# Patient Record
Sex: MALE | Race: WHITE | NOT HISPANIC OR LATINO
[De-identification: names, ages, dates, MRNs, and addresses within clinical notes are randomized per-mention and may not be internally consistent; named-entity substitution may affect disease eponyms.]

---

## 2017-03-07 ENCOUNTER — RECORDS - HEALTHEAST (OUTPATIENT)
Dept: ADMINISTRATIVE | Facility: OTHER | Age: 62
End: 2017-03-07

## 2017-04-21 ENCOUNTER — AMBULATORY - HEALTHEAST (OUTPATIENT)
Dept: PULMONOLOGY | Facility: OTHER | Age: 62
End: 2017-04-21

## 2017-04-21 ENCOUNTER — RECORDS - HEALTHEAST (OUTPATIENT)
Dept: ADMINISTRATIVE | Facility: OTHER | Age: 62
End: 2017-04-21

## 2017-04-21 DIAGNOSIS — R06.02 SOB (SHORTNESS OF BREATH): ICD-10-CM

## 2017-04-27 ENCOUNTER — COMMUNICATION - HEALTHEAST (OUTPATIENT)
Dept: PULMONOLOGY | Facility: OTHER | Age: 62
End: 2017-04-27

## 2017-05-22 ENCOUNTER — OFFICE VISIT - HEALTHEAST (OUTPATIENT)
Dept: PULMONOLOGY | Facility: OTHER | Age: 62
End: 2017-05-22

## 2017-05-22 ENCOUNTER — RECORDS - HEALTHEAST (OUTPATIENT)
Dept: PULMONOLOGY | Facility: OTHER | Age: 62
End: 2017-05-22

## 2017-05-22 ENCOUNTER — RECORDS - HEALTHEAST (OUTPATIENT)
Dept: ADMINISTRATIVE | Facility: OTHER | Age: 62
End: 2017-05-22

## 2017-05-22 DIAGNOSIS — J43.9 PULMONARY EMPHYSEMA, UNSPECIFIED EMPHYSEMA TYPE (H): ICD-10-CM

## 2017-05-22 DIAGNOSIS — R06.02 SHORTNESS OF BREATH: ICD-10-CM

## 2017-05-22 RX ORDER — ALBUTEROL SULFATE 90 UG/1
2 AEROSOL, METERED RESPIRATORY (INHALATION) EVERY 6 HOURS PRN
Status: SHIPPED | COMMUNITY
Start: 2017-05-22

## 2017-05-22 RX ORDER — TIOTROPIUM BROMIDE 18 UG/1
18 CAPSULE ORAL; RESPIRATORY (INHALATION) DAILY
Status: SHIPPED | COMMUNITY
Start: 2017-05-22

## 2017-05-22 RX ORDER — MULTIPLE VITAMINS W/ MINERALS TAB 9MG-400MCG
1 TAB ORAL DAILY
Status: SHIPPED | COMMUNITY
Start: 2017-05-22

## 2017-05-22 ASSESSMENT — MIFFLIN-ST. JEOR: SCORE: 1477.99

## 2017-05-23 ENCOUNTER — AMBULATORY - HEALTHEAST (OUTPATIENT)
Dept: PULMONOLOGY | Facility: OTHER | Age: 62
End: 2017-05-23

## 2017-05-23 DIAGNOSIS — J44.9 COPD (CHRONIC OBSTRUCTIVE PULMONARY DISEASE) (H): ICD-10-CM

## 2017-06-01 ENCOUNTER — AMBULATORY - HEALTHEAST (OUTPATIENT)
Dept: PULMONOLOGY | Facility: OTHER | Age: 62
End: 2017-06-01

## 2017-06-14 ENCOUNTER — RECORDS - HEALTHEAST (OUTPATIENT)
Dept: ADMINISTRATIVE | Facility: OTHER | Age: 62
End: 2017-06-14

## 2017-06-15 ENCOUNTER — RECORDS - HEALTHEAST (OUTPATIENT)
Dept: ADMINISTRATIVE | Facility: OTHER | Age: 62
End: 2017-06-15

## 2017-09-11 ENCOUNTER — OFFICE VISIT - HEALTHEAST (OUTPATIENT)
Dept: PULMONOLOGY | Facility: OTHER | Age: 62
End: 2017-09-11

## 2017-09-11 ENCOUNTER — HOSPITAL ENCOUNTER (OUTPATIENT)
Dept: RESPIRATORY THERAPY | Facility: HOSPITAL | Age: 62
Discharge: HOME | End: 2017-09-11

## 2017-09-11 DIAGNOSIS — Z72.0 TOBACCO ABUSE: ICD-10-CM

## 2017-09-11 DIAGNOSIS — J44.9 CHRONIC OBSTRUCTIVE PULMONARY DISEASE, UNSPECIFIED COPD TYPE (H): ICD-10-CM

## 2017-09-11 DIAGNOSIS — J43.9 PULMONARY EMPHYSEMA, UNSPECIFIED EMPHYSEMA TYPE (H): ICD-10-CM

## 2017-09-11 RX ORDER — BUDESONIDE AND FORMOTEROL FUMARATE DIHYDRATE 160; 4.5 UG/1; UG/1
2 AEROSOL RESPIRATORY (INHALATION) 2 TIMES DAILY
Status: SHIPPED | COMMUNITY
Start: 2017-09-11

## 2017-11-17 ENCOUNTER — COMMUNICATION - HEALTHEAST (OUTPATIENT)
Dept: PULMONOLOGY | Facility: OTHER | Age: 62
End: 2017-11-17

## 2018-05-07 ENCOUNTER — RECORDS - HEALTHEAST (OUTPATIENT)
Dept: ADMINISTRATIVE | Facility: OTHER | Age: 63
End: 2018-05-07

## 2021-05-26 ENCOUNTER — RECORDS - HEALTHEAST (OUTPATIENT)
Dept: ADMINISTRATIVE | Facility: CLINIC | Age: 66
End: 2021-05-26

## 2021-05-31 ENCOUNTER — RECORDS - HEALTHEAST (OUTPATIENT)
Dept: ADMINISTRATIVE | Facility: CLINIC | Age: 66
End: 2021-05-31

## 2021-05-31 VITALS — HEIGHT: 71 IN | BODY MASS INDEX: 20.86 KG/M2 | WEIGHT: 149 LBS

## 2021-05-31 VITALS — WEIGHT: 152.1 LBS | BODY MASS INDEX: 21.21 KG/M2

## 2021-06-01 ENCOUNTER — RECORDS - HEALTHEAST (OUTPATIENT)
Dept: ADMINISTRATIVE | Facility: CLINIC | Age: 66
End: 2021-06-01

## 2021-06-10 NOTE — PROGRESS NOTES
Patient oxygen saturation on RA at rest is 95%.  Oxygen saturation after ambulating 300ft on RA is 92%, then did a flight of stairs on RA and O2 sats were 88%.    After ambulating 300ft on 2LPM oxygen saturation is 95-96%.    Patient is ambulatory within his/her home.

## 2021-06-10 NOTE — PROGRESS NOTES
CCx:  Shortness of breath    HPI:   Gregor Soriano is a 62-year-old male who was diagnosed with COPD back in 2009.  However, he said looking back at his reports of his scans in the mid 2000s that he had emphysema on an MRI report.  Patient was started on Spiriva and Advair back in 2009 when he was first diagnosed.  He states that at that time he was doing well.  Did not have much in terms of shortness of breath or cough.  He was then switched to Symbicort 2014 because of some insurance changes.  He states that at that time, he came down with a bad respiratory illness but did not require hospitalization.  He was never hospitalized or been on mechanical ventilation.  He has since switched back to Advair.  States that really cold or hot weather seem to bother him the most.  He is short of breath with moderate exertion.Otherwise, he does not note any specific alleviating or aggravating factors.  He was a previous smoker but quit in 2007.  He continues to smoke cannabis.  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  He does not note any specific triggers.    Work History: building maintenance, cleaning solutions  Hobbies: gardening  Pets: 3 cats and 1 dog  Tobacco Use: quit 2007, 1-1.5 for 38 years smoke cannabis  Home Environment: single family home, has trouble with basement stairs    PMH:  Kidney stones    PSH:  [place] teeth    SH:  Social History     Social History     Marital status: Single     Spouse name: N/A     Number of children: N/A     Years of education: N/A     Occupational History     Not on file.     Social History Main Topics     Smoking status: Current Some Day Smoker     Smokeless tobacco: Not on file     Alcohol use Not on file     Drug use: Yes     Special: Marijuana      Comment: very rare     Sexual activity: Not on file     Other Topics Concern     Not on file     Social History Narrative     No narrative on file       Family history:  Mother, brother, sister -  emphysema    ROS:  Review of Systems - History obtained from the patient  General ROS: negative  Psychological ROS: negative  ENT ROS: negative  Allergy and Immunology ROS: negative  Endocrine ROS: negative  Respiratory ROS: positive for - cough, shortness of breath and wheezing  negative for - sputum changes  Cardiovascular ROS: no chest pain or dyspnea on exertion  Gastrointestinal ROS: no abdominal pain, change in bowel habits, or black or bloody stools  Genito-Urinary ROS: no dysuria, trouble voiding, or hematuria  Musculoskeletal ROS: negative  Neurological ROS: no TIA or stroke symptoms  Dermatological ROS: negative      Current Meds:  Current Outpatient Prescriptions   Medication Sig     albuterol (PROAIR HFA;PROVENTIL HFA;VENTOLIN HFA) 90 mcg/actuation inhaler Inhale 2 puffs every 6 (six) hours as needed for wheezing.     fluticasone-salmeterol (ADVAIR HFA) 115-21 mcg/actuation inhaler Inhale 2 puffs 2 (two) times a day.     guaiFENesin ER (MUCUS RELIEF) 600 mg 12 hr tablet Take 1,200 mg by mouth 2 (two) times a day.     multivitamin with minerals (THERA-M) 9 mg iron-400 mcg Tab tablet Take 1 tablet by mouth daily.     tiotropium (SPIRIVA) 18 mcg inhalation capsule Place 18 mcg into inhaler and inhale daily.       Labs:  Recent Results (from the past 72 hour(s))   POCT hemoglobin   Result Value Ref Range    Hgb 13.6 7.0 g/dL       I have personally reviewed all imaging and PFT data available pertinent to this visit.    Imaging studies:      PFTs: 5/22/17  FEV1/FVC is 39% and is reduced.  FEV1 is 31% predicted and is reduced.  FVC is 60% predicted and reduced.  There was improvement in spirometry after a single inhaled dose of bronchodilator.  TLC is 130% predicted and is increased.  RV is 269% predicted and is increased.  DLCO is 31% predicted and is reduced.    Impression:  Full Pulmonary Function Test is abnormal.  PFTs are consistent with very severe  obstructive disease.  Bronchodilator response is  "consistent with reversibility.  There is hyperinflation.  There is air-trapping.  Diffusion capacity when corrected for hemoglobin is severely reduced.    Physical Exam:  /74  Pulse 76  Resp 20  Ht 5' 11\" (1.803 m)  Wt 149 lb (67.6 kg)  SpO2 95% Comment: RA  BMI 20.78 kg/m2  General - Well nourished  Ears/Mouth - TMs clear bilaterally,  OP pink moist, no thrush  Neck - no cervical lymphadenopathy  Lungs - diminished breath sounds throughout  CVS - regular rhythm with no murmurs, rubs or gallups  Abdomen - soft, NT, ND, NABS  Ext - no cyanosis, clubbing or edema  Skin - no rash  Psychology - alert and oriented, answers appropriate      Assessment and Plan:  1. COPD - very severe obstruction on PFTs.  Already on advair, spiriva, and albuterol prn.  Continue those for now.  Did ambulate on room air, and patient's O2 saturation did drop to 88%.  Will prescribe home oxygen at 2lnc with exertion.  Patient resistant to vaccinations, advised that they would help protect him, but he declined.  Counseled on inhaler use.  Has already quit smoking.  Discussed about possible referral to lung transplant for evaluation given his low diffusion and FEV1.  Also checked alpha 1 antitrypsin with emphysema affecting so many relatives at a young age.      2. Allergic rhinitis.  Advised to try OTC allergy medication which may help some.      F/U 3 months with 6 Minute Walk Test    Due to desaturation of SaO2 less than or equal to 88% on Room air from COPD, home oxygen therapy will benefit my patient's condition.  The patient has tried advair and spiriva with limited success and oxygen is still required.  This patient is mobile in the home and requires portability.    Patient needs portable oxygen with ambulation at 2LPM/NC.  Patient also needs POC.  OK for conserving device.  OK to do titration study if needed--keep oxygen saturation 89% and above.  Please issue conserving device with appropriate titrated setting after patient " completes successful assessment.    Checo Warner MD  Pulmonary and Critical Care Medicine  [phone]

## 2021-06-10 NOTE — PROGRESS NOTES
Called Nelson back to let him know we could try Dulera or Breo in place of the Advair, he said no he just wants to go back to the Symbicort.  Symbicort ordered and will leave a sputum culture at his primary Md's office on Thursday when he goes in.

## 2021-06-11 NOTE — PROGRESS NOTES
According to Abril at [facility] [place] the patient has been approved for a portable oxygen concentrator.

## 2021-06-11 NOTE — PROGRESS NOTES
Order for portable concentrator is in process and being handled through the [facility].  The phone number is [phone] and the contact is José Miguel.

## 2021-06-11 NOTE — PROGRESS NOTES
Spoke with Sandy at [facility] and she said that the order for portable concentrator is in process.

## 2021-06-12 NOTE — PROGRESS NOTES
CCx: F/U COPD    HPI:  Gregor Soriano is a 62-year-old male who was diagnosed with COPD back in 2009.  However, he said looking back at his reports of his scans in the mid 2000s that he had emphysema on an MRI report.  Patient was started on Spiriva and Advair back in 2009 when he was first diagnosed.  He states that at that time he was doing well.  Did not have much in terms of shortness of breath or cough.  He was then switched to Symbicort 2014 because of some insurance changes.  He states that at that time, he came down with a bad respiratory illness but did not require hospitalization.  He was never hospitalized or been on mechanical ventilation.  He has since switched back to Advair.  States that really cold or hot weather seem to bother him the most.  He is short of breath with moderate exertion.Otherwise, he does not note any specific alleviating or aggravating factors.  He was a previous smoker but quit in 2007.  He continues to smoke cannabis.  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  He does not note any specific triggers.    Since his last visit, he was treated for pneumococcal pneumonia with levaquin in May along with steroids. He has since improved [place].  He has since gotten a little worse, but overall feels well right now.  Now getting inhalers from [facility].  Having some sleep disturbance, says he sleeps sometimes only 2-3 hours a night.  But when he gets 6-7 hours of sleep, he feels well.      Work History: building maintenance, cleaning solutions  Hobbies: gardening  Pets: 3 cats and 1 dog  Tobacco Use: quit 2007, 1-1.5 for 38 years smoke cannabis  Home Environment: single family home, has trouble with basement stairs    ROS:  Review of Systems - History obtained from the patient  General ROS: negative  Psychological ROS: negative  ENT ROS: negative  Allergy and Immunology ROS: negative  Endocrine ROS: negative  Respiratory ROS: positive  for - cough and shortness of breath  negative for - stridor  Cardiovascular ROS: no chest pain or palpitations  Gastrointestinal ROS: no abdominal pain, change in bowel habits, or black or bloody stools  Genito-Urinary ROS: no dysuria, trouble voiding, or hematuria  Musculoskeletal ROS: negative  Neurological ROS: no TIA or stroke symptoms  Dermatological ROS: negative      Current Meds:  Current Outpatient Prescriptions   Medication Sig     albuterol (PROAIR HFA;PROVENTIL HFA;VENTOLIN HFA) 90 mcg/actuation inhaler Inhale 2 puffs every 6 (six) hours as needed for wheezing.     budesonide-formoterol (SYMBICORT) 160-4.5 mcg/actuation inhaler Inhale 2 puffs 2 (two) times a day.     multivitamin with minerals (THERA-M) 9 mg iron-400 mcg Tab tablet Take 1 tablet by mouth daily.     tiotropium (SPIRIVA) 18 mcg inhalation capsule Place 18 mcg into inhaler and inhale daily.       Labs:  No results found for this or any previous visit (from the past 72 hour(s)).    I have personally reviewed all pertinent imaging studies and PFT results unless otherwise noted.    Imaging studies:  No results found.      PFTs:   5/22/17  FEV1/FVC is 39% and is reduced.  FEV1 is 31% predicted and is reduced.  FVC is 60% predicted and reduced.  There was improvement in spirometry after a single inhaled dose of bronchodilator.  TLC is 130% predicted and is increased.  RV is 269% predicted and is increased.  DLCO is 31% predicted and is reduced.    Impression:  Full Pulmonary Function Test is abnormal.  PFTs are consistent with very severe  obstructive disease.  Bronchodilator response is consistent with reversibility.  There is hyperinflation.  There is air-trapping.  Diffusion capacity when corrected for hemoglobin is severely reduced.    6MWT 9/11/17  PFT 6 Minute Walk Test     Six minute walk distance demonstrates  mild functional impairment with a 6MWD of 420 meters, 68% predicted.  No stops     Gas exchange was  abnormal in that pulse  oximetry demonstrated  a drop in hemoglobin O2 saturation to 86% with exercise.     At peak exercise, the heart rate was 61% maximum predicted, indicating cardiovascular  reserve remained.     Blood pressure was normal at rest and  increased appropriately with exercise.    Physical Exam:  /60  Pulse 84  Resp 24  Wt 152 lb 1.6 oz (69 kg)  SpO2 95% Comment: NOHELIA  BMI 21.21 kg/m2  General - Well nourished  Ears/Mouth - TMs clear bilaterally,  OP pink moist, no thrush  Neck - no cervical lymphadenopathy  Lungs - decreased breath sounds bilaterally  CVS - regular rhythm with no murmurs, rubs or gallups  Abdomen - soft, NT, ND, NABS  Ext - no cyanosis, clubbing or edema  Skin - no rash  Psychology - alert and oriented, answers appropriate      Assessment and Plan:  1. COPD - Agreeable to vaccinations now, but would like to recover a little from a recent pneumonia before getting vaccinations.  Discussed transplant, he is not interested currently.  Continue bronchodilators, he gets them from [facility] currently.  Very severe disease with FEV1 and DLCO both 31% predicted.  Encouraged use of supplemental oxygen.      2. Tobacco abuse/lung nodule screening:  Lung Cancer Screening pre-scan counseling Visit    The patient fits the risk profile of patients who benefit from this screening:  -The patient is >55 years old and <80 years old  -The patient has 50 pack year history (over 30)  -The patient has smoked within the past 15 years  -The patient has no medical comorbidity severe enough that it would cause mortality prior to mortality due to the lung cancer attempting to be detected.    Discussion with patient regarding the harms associated with LDCT screening include false-negative and false-positive results, incidental findings, overdiagnosis, and radiation exposure were reviewed at length.   The patient understands that pursuing this screening test may result in a biopsy that was not necessary. It may also produced added  stress over a nodule that is likely not cancer.    Of 100 patients who get screening, 25 will have a positive scan. Of those 25, only 1 will have cancer.  Overdiagnosis is estimated at 10% of patients-- they would not have been detected in the patient's lifetime without screening. Less than 1% of patients likely had death related to radiation exposure increase.   Average low-dose CT associated with 0.61 to 1.5 mSv. Annual background radiation exposure in the [place] averages 2.4 mS; mammogram is 0.7mSv.    The benefits are reduction in risk of death from lung cancer. The number needed to treat is 320 (for every 320 patients who undergo screening, 1 patient will have a benefit in mortality from early detection from the screening).    Undergoing this screening implies willingness to pursue further potentially invasive testing to discover potential cancer.    All questions were answered.    The patient was counseled regarding smoking cessation and its risk for lung cancer.    The patient s results will be followed in our pulmonary registry and will be recalled based on the findings of the CT scans    F/U 3 months    Checo Warner MD  Pulmonary and Critical Care Medicine  [phone]

## 2021-08-22 ENCOUNTER — HEALTH MAINTENANCE LETTER (OUTPATIENT)
Age: 66
End: 2021-08-22

## 2021-10-16 ENCOUNTER — HEALTH MAINTENANCE LETTER (OUTPATIENT)
Age: 66
End: 2021-10-16

## 2022-10-01 ENCOUNTER — HEALTH MAINTENANCE LETTER (OUTPATIENT)
Age: 67
End: 2022-10-01

## 2023-10-15 ENCOUNTER — HEALTH MAINTENANCE LETTER (OUTPATIENT)
Age: 68
End: 2023-10-15